# Patient Record
Sex: MALE | Race: WHITE | NOT HISPANIC OR LATINO | Employment: OTHER | URBAN - NONMETROPOLITAN AREA
[De-identification: names, ages, dates, MRNs, and addresses within clinical notes are randomized per-mention and may not be internally consistent; named-entity substitution may affect disease eponyms.]

---

## 2017-02-07 ENCOUNTER — OFFICE VISIT (OUTPATIENT)
Dept: CARDIOLOGY | Facility: CLINIC | Age: 67
End: 2017-02-07

## 2017-02-07 VITALS
DIASTOLIC BLOOD PRESSURE: 60 MMHG | BODY MASS INDEX: 26.37 KG/M2 | WEIGHT: 174 LBS | HEART RATE: 80 BPM | HEIGHT: 68 IN | SYSTOLIC BLOOD PRESSURE: 110 MMHG

## 2017-02-07 DIAGNOSIS — I73.9 PVD (PERIPHERAL VASCULAR DISEASE) (HCC): ICD-10-CM

## 2017-02-07 DIAGNOSIS — I25.118 CORONARY ARTERY DISEASE INVOLVING NATIVE CORONARY ARTERY OF NATIVE HEART WITH OTHER FORM OF ANGINA PECTORIS (HCC): Primary | ICD-10-CM

## 2017-02-07 DIAGNOSIS — J44.9 COPD MIXED TYPE (HCC): ICD-10-CM

## 2017-02-07 DIAGNOSIS — R06.02 SHORTNESS OF BREATH: ICD-10-CM

## 2017-02-07 DIAGNOSIS — I25.10 CORONARY ARTERY DISEASE INVOLVING NATIVE CORONARY ARTERY OF NATIVE HEART WITHOUT ANGINA PECTORIS: ICD-10-CM

## 2017-02-07 DIAGNOSIS — R60.0 LOCALIZED EDEMA: ICD-10-CM

## 2017-02-07 DIAGNOSIS — R07.89 OTHER CHEST PAIN: ICD-10-CM

## 2017-02-07 PROBLEM — R07.9 CHEST PAIN: Status: ACTIVE | Noted: 2017-02-07

## 2017-02-07 PROBLEM — E78.5 HYPERLIPEMIA: Status: ACTIVE | Noted: 2017-02-07

## 2017-02-07 PROCEDURE — 99214 OFFICE O/P EST MOD 30 MIN: CPT | Performed by: NURSE PRACTITIONER

## 2017-02-07 PROCEDURE — 93000 ELECTROCARDIOGRAM COMPLETE: CPT | Performed by: NURSE PRACTITIONER

## 2017-02-07 RX ORDER — ATORVASTATIN CALCIUM 40 MG/1
40 TABLET, FILM COATED ORAL DAILY
COMMUNITY

## 2017-02-07 RX ORDER — ONDANSETRON 4 MG/1
4 TABLET, ORALLY DISINTEGRATING ORAL EVERY 8 HOURS PRN
COMMUNITY

## 2017-02-07 RX ORDER — FAMOTIDINE 20 MG/1
20 TABLET, FILM COATED ORAL 2 TIMES DAILY PRN
COMMUNITY

## 2017-02-07 RX ORDER — SENNOSIDES 8.6 MG
650 CAPSULE ORAL EVERY 8 HOURS PRN
COMMUNITY

## 2017-02-07 RX ORDER — FUROSEMIDE 40 MG/1
40 TABLET ORAL DAILY
COMMUNITY

## 2017-02-07 RX ORDER — ISOSORBIDE MONONITRATE 30 MG/1
30 TABLET, EXTENDED RELEASE ORAL DAILY
COMMUNITY
End: 2017-02-14 | Stop reason: DRUGHIGH

## 2017-02-07 RX ORDER — CILOSTAZOL 100 MG/1
100 TABLET ORAL 2 TIMES DAILY
COMMUNITY

## 2017-02-07 RX ORDER — METOPROLOL TARTRATE 75 MG/1
75 TABLET, FILM COATED ORAL EVERY 8 HOURS
COMMUNITY
End: 2018-01-01 | Stop reason: DRUGHIGH

## 2017-02-07 RX ORDER — CLOPIDOGREL BISULFATE 75 MG/1
75 TABLET ORAL DAILY
COMMUNITY

## 2017-02-07 RX ORDER — POTASSIUM CHLORIDE 20 MEQ/1
20 TABLET, EXTENDED RELEASE ORAL DAILY
COMMUNITY

## 2017-02-07 RX ORDER — MULTIPLE VITAMINS W/ MINERALS TAB 9MG-400MCG
1 TAB ORAL DAILY
COMMUNITY

## 2017-02-07 RX ORDER — ASPIRIN 81 MG/1
81 TABLET, CHEWABLE ORAL DAILY
COMMUNITY

## 2017-02-07 NOTE — PROGRESS NOTES
Chief Complaint   Patient presents with   • Follow-up     was just recently discharged from  hospital due to appendics ruptured and aspiration pneumonia, patient reports soa and chest pain, patient has been residing at [facility] since discharge from  hospital. patient wears oxygen at 2 liters at night., also has bipap.       Cardiac Complaints  chest pressure/discomfort and dyspnea edema      Subjective   Kevin Castro is a 67 y.o. male with a history of severe peripheral vascular disease for which he had bilateral iliac stenting in 2014.  At his cardiac cath at the same time, it showed chronically occluded RCA and multiple lesions in the circumflex which were not suitable for stenting.  The LAD was reported with moderate stenosis and slightly diminished LV function, medication management recommended.  He returns today for follow up and states recent discharge from  for ruptured appendix and aspiration pneumonia, no records are available from admission. Since discharge from the hospital, he has been wearing oxygen to sleep and resides at [facility]. He had some some issues with aspiration pneumonia and has just finished a swallow study and daughter reports he was in the hospital at  for 3 months. He returns today for follow up and states he has issues with shortness of air with exertion and chest discomfort that seems to be around constantly and remains about the same with any activity. Daughter states physicians at  wanted patient to have more cardiac workup since he had been having concerns while in hospital, cardiac workup was not done during stay. He has been taking NTG for it and it seems to help, he has not had any since he has been out of  the hospital.  He states that blood work will be monitored with PCP/nursing home.  He does report a little bit more swelling recently, but states this started with being on his feet more, he is taking lasix for this.  He denies any issues with  claudication.        Cardiac History  Past Surgical History   Procedure Laterality Date   • Duplex scans - converted  07/31/2014     ([facility]) Increased Velocities    • Echo - converted  07/31/2014     ([facility]. Dr. Giang) EF 60%    • Other surgical history  07/31/2014     CTA Abd- 80% Both Iliac    • Cardiovascular stress test  09/25/2014      L. Myoview- Inferiolateral Ischemia.    • Cardiac catheterization  10/08/2014     (Dr. Hastings) 100% RCA. Mul long 80% LCX. EF 50%.    • Other surgical history  10/08/2014     P. Angio- (Dr. Hastings) Bubba Iliac Stenting        Current Outpatient Prescriptions   Medication Sig Dispense Refill   • acetaminophen (TYLENOL) 650 MG 8 hr tablet Take 650 mg by mouth Every 8 (Eight) Hours As Needed for mild pain (1-3).     • aspirin 81 MG chewable tablet Chew 81 mg Daily.     • atorvastatin (LIPITOR) 40 MG tablet Take 40 mg by mouth Daily.     • cilostazol (PLETAL) 100 MG tablet Take 100 mg by mouth 2 (Two) Times a Day.     • clopidogrel (PLAVIX) 75 MG tablet Take 75 mg by mouth Daily.     • famotidine (PEPCID) 20 MG tablet Take 20 mg by mouth 2 (Two) Times a Day.     • furosemide (LASIX) 40 MG tablet Take 40 mg by mouth Daily.     • isosorbide mononitrate (IMDUR) 30 MG 24 hr tablet Take 30 mg by mouth Daily.     • magnesium oxide (MAGOX) 400 (241.3 MG) MG tablet tablet Take 400 mg by mouth 2 (Two) Times a Day.     • Metoprolol Tartrate 75 MG tablet Take 75 mg by mouth Every 8 (Eight) Hours.     • Multiple Vitamins-Minerals (MULTIVITAMIN WITH MINERALS) tablet tablet Take 1 tablet by mouth Daily.     • ondansetron ODT (ZOFRAN-ODT) 4 MG disintegrating tablet Take 4 mg by mouth Every 8 (Eight) Hours As Needed for nausea or vomiting.     • potassium chloride (K-DUR,KLOR-CON) 20 MEQ CR tablet Take 20 mEq by mouth Daily.       No current facility-administered medications for this visit.        Review of patient's allergies indicates no known allergies.    Past Medical History   Diagnosis Date   • ASVD  "(arteriosclerotic vascular disease)    • CAD (coronary artery disease)    • COPD (chronic obstructive pulmonary disease)    • GERD (gastroesophageal reflux disease)    • H/O ventral hernia repair 2004   • History of appendectomy         • Hyperlipidemia    • Hypertension    • PAD (peripheral artery disease)    • Peripheral vascular disease      Claudication    • Tobacco abuse        Social History     Social History   • Marital status: Single     Spouse name: N/A   • Number of children: N/A   • Years of education: N/A     Occupational History   • Not on file.     Social History Main Topics   • Smoking status: Former Smoker     Quit date: 1/7/2010   • Smokeless tobacco: Not on file   • Alcohol use No   • Drug use: No   • Sexual activity: Not on file     Other Topics Concern   • Not on file     Social History Narrative       Family History   Problem Relation Age of Onset   • Hypertension Mother    • Alzheimer's disease Mother    • Hypertension Father    • Heart disease Father    • Heart disease Daughter    • Other Daughter      SVT    • Melanoma Son      stage 3       Review of Systems   Constitutional: Negative.    Respiratory: Positive for shortness of breath.    Cardiovascular: Positive for chest pain and leg swelling.   Gastrointestinal: Negative.    Genitourinary: Negative.    Musculoskeletal: Negative.    Skin: Negative.    Neurological: Negative.    Psychiatric/Behavioral: Negative.        DiabetesNo  Thyroidnormal    Objective     Visit Vitals   • /60 (BP Location: Left arm)   • Pulse 80   • Ht 68\" (172.7 cm)   • Wt 174 lb (78.9 kg)   • BMI 26.46 kg/m2       Physical Exam   Constitutional: He is oriented to person, place, and time. He appears well-developed and well-nourished.   HENT:   Head: Normocephalic and atraumatic.   Eyes: EOM are normal. Pupils are equal, round, and reactive to light.   Neck: Normal range of motion.   Cardiovascular: Normal rate and regular rhythm.    Murmur " heard.  Pulmonary/Chest: Effort normal and breath sounds normal.   Abdominal: Soft.   Musculoskeletal: Normal range of motion. He exhibits edema.   Neurological: He is alert and oriented to person, place, and time.   Skin: Skin is warm and dry.   Psychiatric: He has a normal mood and affect.         ECG 12 Lead  Date/Time: 2/7/2017 3:33 PM  Performed by: KWASI GUY  Authorized by: KWASI GUY   Rhythm: sinus rhythm  BPM: 88  Conduction: incomplete RBBB  Clinical impression: abnormal ECG  Comments: RSR pattern V1           Assessment/Plan     HR and BP are stable.  EKG done today for chest discomfort shows a sinus rhythm with RSR in V1.  We will advise on repeat workup with stress and echo since patient continues to have shortness of breath and chest discomfort, more recommendations to follow.  Patient encouraged to continue daily use of imdur for chest discomfort.  Labs he reports with you, could you please add a BNP and CMP to next draw?  Edema seems to be about 1+ in RLE but patient reports foot drop and just beginning to walk again, patient encouraged to limit sodium intake. Refills he states with you as well.  Lung status and dysphagia protocol he reports as being monitored closely at the nursing home.  6 month follow up advised or sooner if needed.        Problems Addressed this Visit        Cardiovascular and Mediastinum    PVD (peripheral vascular disease)    Relevant Orders    ECG 12 Lead    Adult Transthoracic Echo Complete    Stress Test With Myocardial Perfusion One Day    CAD (coronary artery disease) - Primary    Relevant Medications    isosorbide mononitrate (IMDUR) 30 MG 24 hr tablet    Metoprolol Tartrate 75 MG tablet    clopidogrel (PLAVIX) 75 MG tablet    cilostazol (PLETAL) 100 MG tablet    Other Relevant Orders    ECG 12 Lead    Adult Transthoracic Echo Complete    Stress Test With Myocardial Perfusion One Day       Nervous and Auditory    Chest pain    Relevant Orders    ECG 12 Lead     Adult Transthoracic Echo Complete    Stress Test With Myocardial Perfusion One Day      Other Visit Diagnoses     COPD mixed type        Shortness of breath        Relevant Orders    ECG 12 Lead    Adult Transthoracic Echo Complete    Stress Test With Myocardial Perfusion One Day    Localized edema                  Electronically signed by ARACELY Rowe February 7, 2017 4:21 PM

## 2017-02-13 ENCOUNTER — HOSPITAL ENCOUNTER (OUTPATIENT)
Dept: CARDIOLOGY | Facility: HOSPITAL | Age: 67
Discharge: HOME | End: 2017-02-13

## 2017-02-13 ENCOUNTER — OUTSIDE FACILITY SERVICE (OUTPATIENT)
Dept: CARDIOLOGY | Facility: CLINIC | Age: 67
End: 2017-02-13

## 2017-02-13 LAB
MAXIMAL PREDICTED HEART RATE: 153 BPM
STRESS TARGET HR: 130 BPM

## 2017-02-13 PROCEDURE — 93017 CV STRESS TEST TRACING ONLY: CPT

## 2017-02-13 PROCEDURE — 0 TECHNETIUM SESTAMIBI: Performed by: INTERNAL MEDICINE

## 2017-02-13 PROCEDURE — A9500 TC99M SESTAMIBI: HCPCS | Performed by: INTERNAL MEDICINE

## 2017-02-13 PROCEDURE — 78452 HT MUSCLE IMAGE SPECT MULT: CPT

## 2017-02-13 PROCEDURE — 93018 CV STRESS TEST I&R ONLY: CPT | Performed by: INTERNAL MEDICINE

## 2017-02-13 PROCEDURE — 93306 TTE W/DOPPLER COMPLETE: CPT | Performed by: INTERNAL MEDICINE

## 2017-02-13 PROCEDURE — 25010000002 REGADENOSON 0.4 MG/5ML SOLUTION: Performed by: INTERNAL MEDICINE

## 2017-02-13 PROCEDURE — 78452 HT MUSCLE IMAGE SPECT MULT: CPT | Performed by: INTERNAL MEDICINE

## 2017-02-13 PROCEDURE — 93306 TTE W/DOPPLER COMPLETE: CPT

## 2017-02-13 RX ADMIN — Medication 1 DOSE: at 13:00

## 2017-02-13 RX ADMIN — REGADENOSON 0.4 MG: 0.08 INJECTION, SOLUTION INTRAVENOUS at 13:00

## 2017-02-14 ENCOUNTER — TELEPHONE (OUTPATIENT)
Dept: CARDIOLOGY | Facility: CLINIC | Age: 67
End: 2017-02-14

## 2017-02-14 RX ORDER — ISOSORBIDE MONONITRATE 60 MG/1
60 TABLET, EXTENDED RELEASE ORAL EVERY MORNING
Qty: 30 TABLET | Refills: 8 | Status: SHIPPED | OUTPATIENT
Start: 2017-02-14 | End: 2017-05-08 | Stop reason: SDUPTHER

## 2017-02-14 NOTE — TELEPHONE ENCOUNTER
Patient aware of stress test results and recommendations.  I spoke with his daughter, Lorrie.  Increase Imdur to 60 mg daily and aware if symptoms persist to call the office, may need cardiac catheterization.

## 2017-02-17 ENCOUNTER — TELEPHONE (OUTPATIENT)
Dept: CARDIOLOGY | Facility: CLINIC | Age: 67
End: 2017-02-17

## 2017-02-17 DIAGNOSIS — R60.0 LOCALIZED EDEMA: ICD-10-CM

## 2017-02-17 DIAGNOSIS — I25.10 CORONARY ARTERY DISEASE INVOLVING NATIVE CORONARY ARTERY OF NATIVE HEART WITHOUT ANGINA PECTORIS: ICD-10-CM

## 2017-02-17 DIAGNOSIS — I73.9 PVD (PERIPHERAL VASCULAR DISEASE) (HCC): Primary | ICD-10-CM

## 2017-02-17 NOTE — TELEPHONE ENCOUNTER
----- Message from ARACELY Coyle sent at 2/14/2017  4:58 PM EST -----  Not sure his blood pressure will tolerate 60 of imdur.  His blood pressure was 110/60 at visit.  May need a cath.  His daughter was pretty insistent about it.  ----- Message -----     From: Carina Kim     Sent: 2/14/2017   1:46 PM       To: ARACELY Coyle

## 2017-02-20 NOTE — TELEPHONE ENCOUNTER
I spoke with daughter, Lorrie.  Patient is going to do labs at [facility] then once CMP reviewed then will proceed with CTA.  Lorrie wants CTA scheduled at [facility].

## 2017-03-03 ENCOUNTER — HOSPITAL ENCOUNTER (OUTPATIENT)
Dept: CT IMAGING | Facility: HOSPITAL | Age: 67
Discharge: HOME | End: 2017-03-03
Admitting: NURSE PRACTITIONER

## 2017-03-03 PROCEDURE — 75635 CT ANGIO ABDOMINAL ARTERIES: CPT

## 2017-03-03 PROCEDURE — 0 IOPAMIDOL PER 1 ML: Performed by: NURSE PRACTITIONER

## 2017-03-03 PROCEDURE — 82565 ASSAY OF CREATININE: CPT

## 2017-03-03 RX ADMIN — IOPAMIDOL 150 ML: 755 INJECTION, SOLUTION INTRAVENOUS at 16:20

## 2017-03-05 LAB — CREAT BLDA-MCNC: 1.4 MG/DL (ref 0.6–1.3)

## 2017-05-08 RX ORDER — ISOSORBIDE MONONITRATE 60 MG/1
60 TABLET, EXTENDED RELEASE ORAL EVERY MORNING
Qty: 30 TABLET | Refills: 8 | Status: SHIPPED | OUTPATIENT
Start: 2017-05-08 | End: 2018-01-01 | Stop reason: SDUPTHER

## 2018-01-01 ENCOUNTER — OUTSIDE FACILITY SERVICE (OUTPATIENT)
Dept: CARDIOLOGY | Facility: CLINIC | Age: 68
End: 2018-01-01

## 2018-01-01 ENCOUNTER — TELEPHONE (OUTPATIENT)
Dept: CARDIOLOGY | Facility: CLINIC | Age: 68
End: 2018-01-01

## 2018-01-01 ENCOUNTER — OFFICE VISIT (OUTPATIENT)
Dept: CARDIOLOGY | Facility: CLINIC | Age: 68
End: 2018-01-01

## 2018-01-01 VITALS
HEART RATE: 64 BPM | SYSTOLIC BLOOD PRESSURE: 120 MMHG | DIASTOLIC BLOOD PRESSURE: 76 MMHG | BODY MASS INDEX: 32.89 KG/M2 | HEIGHT: 68 IN | WEIGHT: 217 LBS

## 2018-01-01 DIAGNOSIS — I25.118 CORONARY ARTERY DISEASE OF NATIVE ARTERY OF NATIVE HEART WITH STABLE ANGINA PECTORIS (HCC): ICD-10-CM

## 2018-01-01 DIAGNOSIS — R06.02 SHORTNESS OF BREATH: ICD-10-CM

## 2018-01-01 DIAGNOSIS — E78.2 MIXED HYPERLIPIDEMIA: ICD-10-CM

## 2018-01-01 DIAGNOSIS — R07.89 OTHER CHEST PAIN: ICD-10-CM

## 2018-01-01 DIAGNOSIS — I73.9 PVD (PERIPHERAL VASCULAR DISEASE) (HCC): Primary | ICD-10-CM

## 2018-01-01 DIAGNOSIS — R53.83 OTHER FATIGUE: ICD-10-CM

## 2018-01-01 PROCEDURE — 93306 TTE W/DOPPLER COMPLETE: CPT | Performed by: INTERNAL MEDICINE

## 2018-01-01 PROCEDURE — 99232 SBSQ HOSP IP/OBS MODERATE 35: CPT | Performed by: INTERNAL MEDICINE

## 2018-01-01 PROCEDURE — 99223 1ST HOSP IP/OBS HIGH 75: CPT | Performed by: INTERNAL MEDICINE

## 2018-01-01 PROCEDURE — 93308 TTE F-UP OR LMTD: CPT | Performed by: INTERNAL MEDICINE

## 2018-01-01 PROCEDURE — 99214 OFFICE O/P EST MOD 30 MIN: CPT | Performed by: NURSE PRACTITIONER

## 2018-01-01 RX ORDER — ISOSORBIDE MONONITRATE 60 MG/1
60 TABLET, EXTENDED RELEASE ORAL EVERY MORNING
Qty: 30 TABLET | Refills: 0 | Status: SHIPPED | OUTPATIENT
Start: 2018-01-01 | End: 2018-01-01 | Stop reason: SDUPTHER

## 2018-01-01 RX ORDER — ISOSORBIDE MONONITRATE 60 MG/1
TABLET, EXTENDED RELEASE ORAL
Qty: 30 TABLET | Refills: 5 | Status: SHIPPED | OUTPATIENT
Start: 2018-01-01 | End: 2018-01-01 | Stop reason: SDUPTHER

## 2018-01-01 RX ORDER — ISOSORBIDE MONONITRATE 60 MG/1
60 TABLET, EXTENDED RELEASE ORAL EVERY MORNING
Qty: 90 TABLET | Refills: 3 | Status: SHIPPED | OUTPATIENT
Start: 2018-01-01

## 2018-01-01 RX ORDER — METOPROLOL TARTRATE 100 MG/1
100 TABLET ORAL 2 TIMES DAILY
COMMUNITY

## 2018-03-22 NOTE — TELEPHONE ENCOUNTER
Patients daughter called requesting refill on Imdur 60mg daily, 30 day refill sent to pharmacy on Imdur 60mg daily.

## 2018-04-09 NOTE — PROGRESS NOTES
Chief Complaint   Patient presents with   • Follow-up     Cardiac management. No labs in a year. Daughter verbalized current medication list.   • Chest Pain     He reports having dull aching pain to left chest occasionally when out walking around.   • Shortness of Breath     Having some shortness of breath with exertion, he states still coughs daily, continues to have some swallowing problems, he reports just received referral to Dr Kulkarni.   • Med Refill     Needs refills on Imdur-90 day.       Subjective       Kevin Castro is a 68 y.o. male with a history of IHD, hypertension, hyperlipidemia, COPD, severe peripheral vascular disease for which he had bilateral iliac stenting in 2014.  Cardiac cath was done at that time which showed chronically occluded RCA and multiple lesions in the circumflex which were not suitable for stenting.  The LAD was reported with moderate stenosis and slightly diminished LV function, medication management recommended.  November 2016, he suffered a ruptured appendix complicated by aspiration pneumonia and was admitted to  for an extended period of time (3 months).  Since then, he has had shortness of breath, cough, and wheezing.  After discharged he was placed in a rehab/nursing facility then later went to live with daughter who cares for him.  He regained strength and remains independent with ADLs but admits to being fairly sedentary.  Cardiac work up was repeated after last fu 2/2017 for reports of shortness of breath and occasional chest tightness relieved by sl nitro.  Lexiscan showed moderate infarct of the inferolateral wall with no significant reversibility and LVEF 46% during stress and 49% during rest.  Imdur was increased to 60 mg daily with plan for cath if symptoms persisted.  Echo showed EF 45-50%, mild MR, RVSP 29 mmHg.  CTA of LE was done at  in March 2017 and was told vessels were patent.  No report available.  He came in today for his follow up visit accompanied by  his daughter.  His chief complaint today is shortness of breath.  When he gets up to do any activity, he feels short of breath, tight and wheezy.  He continues to have occasional chest discomfort but not significant.  He is using sl nitro less often.  He is planning to see Dr. Kulkarni soon for his initial pulmonary consult.  Denies orthopnea, edema has improved.  He has gained more than 40 pounds since last fu one year ago, but weight was down after lengthy admission.   No recent labs.  He has maintained smoking cessation since 2010.    HPI         Cardiac History:    Past Surgical History:   Procedure Laterality Date   • CARDIAC CATHETERIZATION  10/08/2014    (Dr. Hastings) 100% RCA. Mul long 80% LCX. EF 50%.    • CARDIOVASCULAR STRESS TEST  09/25/2014     L. Myoview- Inferiolateral Ischemia.    • CARDIOVASCULAR STRESS TEST  02/13/2017    Lexiscan- inferolateral infarct without reversibilty, EF 46% stress, 49% rest   • CONVERTED (HISTORICAL) DUPLEX SCANS  07/31/2014    ([facility]) Increased Velocities    • ECHO - CONVERTED  07/31/2014    ([facility]. Dr. Giang) EF 60%    • ILIAC ARTERY STENT Bilateral    • OTHER SURGICAL HISTORY  07/31/2014    CTA Abd- 80% Both Iliac    • OTHER SURGICAL HISTORY  10/08/2014    P. Angio- (Dr. Hastings) Bubba Iliac Stenting        Current Outpatient Prescriptions   Medication Sig Dispense Refill   • acetaminophen (TYLENOL) 650 MG 8 hr tablet Take 650 mg by mouth Every 8 (Eight) Hours As Needed for mild pain (1-3).     • aspirin 81 MG chewable tablet Chew 81 mg Daily.     • atorvastatin (LIPITOR) 40 MG tablet Take 40 mg by mouth Daily.     • cilostazol (PLETAL) 100 MG tablet Take 100 mg by mouth 2 (Two) Times a Day.     • clopidogrel (PLAVIX) 75 MG tablet Take 75 mg by mouth Daily.     • famotidine (PEPCID) 20 MG tablet Take 20 mg by mouth 2 (Two) Times a Day As Needed.     • furosemide (LASIX) 40 MG tablet Take 40 mg by mouth Daily.     • isosorbide mononitrate (IMDUR) 60 MG 24 hr tablet Take 1 tablet by  mouth Every Morning. 90 tablet 3   • magnesium oxide (MAGOX) 400 (241.3 MG) MG tablet tablet Take 400 mg by mouth 2 (Two) Times a Day.     • metoprolol tartrate (LOPRESSOR) 100 MG tablet Take 100 mg by mouth 2 (Two) Times a Day.     • Multiple Vitamins-Minerals (MULTIVITAMIN WITH MINERALS) tablet tablet Take 1 tablet by mouth Daily.     • ondansetron ODT (ZOFRAN-ODT) 4 MG disintegrating tablet Take 4 mg by mouth Every 8 (Eight) Hours As Needed for nausea or vomiting.     • potassium chloride (K-DUR,KLOR-CON) 20 MEQ CR tablet Take 20 mEq by mouth Daily.       No current facility-administered medications for this visit.        Review of patient's allergies indicates no known allergies.    Past Medical History:   Diagnosis Date   • ASVD (arteriosclerotic vascular disease)    • CAD (coronary artery disease)    • COPD (chronic obstructive pulmonary disease)    • GERD (gastroesophageal reflux disease)    • H/O ventral hernia repair 2004   • History of appendectomy        • Hyperlipidemia    • Hypertension    • PAD (peripheral artery disease)    • Peripheral vascular disease     Claudication    • Tobacco abuse        Social History     Social History   • Marital status: Single     Spouse name: N/A   • Number of children: N/A   • Years of education: N/A     Occupational History   • Not on file.     Social History Main Topics   • Smoking status: Former Smoker     Quit date: 1/7/2010   • Smokeless tobacco: Never Used   • Alcohol use No   • Drug use: No   • Sexual activity: Not on file     Other Topics Concern   • Not on file     Social History Narrative   • No narrative on file       Family History   Problem Relation Age of Onset   • Hypertension Mother    • Alzheimer's disease Mother    • Hypertension Father    • Heart disease Father    • Heart disease Daughter    • Other Daughter      SVT    • Melanoma Son      stage 3       Review of Systems   Constitution: Positive for weakness, malaise/fatigue and weight gain.  "Negative for decreased appetite.   Eyes: Negative.    Cardiovascular: Positive for chest pain and dyspnea on exertion. Negative for leg swelling (improved with Lasix), palpitations and syncope.   Respiratory: Positive for cough, shortness of breath, sleep disturbances due to breathing and wheezing. Negative for hemoptysis.    Endocrine: Negative.    Hematologic/Lymphatic: Negative for bleeding problem. Does not bruise/bleed easily.   Skin: Negative.    Musculoskeletal: Positive for arthritis and joint pain.   Gastrointestinal: Positive for dysphagia (ongoing). Negative for abdominal pain and melena.   Genitourinary: Negative for dysuria and hematuria.   Neurological: Positive for focal weakness (right foot drop, improving ). Negative for dizziness.   Psychiatric/Behavioral: Negative for altered mental status and depression.      Diabetes- No  Thyroid-normal, no labs for review     Objective     /76 (BP Location: Left arm)   Pulse 64   Ht 172.7 cm (67.99\")   Wt 98.4 kg (217 lb)   BMI 33.00 kg/m²     Physical Exam   Constitutional: He is oriented to person, place, and time. He appears well-developed and well-nourished.   HENT:   Head: Normocephalic.   Eyes: Pupils are equal, round, and reactive to light.   Neck: Normal range of motion.   Cardiovascular: Normal rate, regular rhythm and intact distal pulses.    Murmur heard.  Pulmonary/Chest: Effort normal and breath sounds normal. No respiratory distress. He has no wheezes.   Abdominal: Soft. Bowel sounds are normal. He exhibits no distension.   Musculoskeletal: Normal range of motion. He exhibits no edema.   Neurological: He is alert and oriented to person, place, and time.   Skin: Skin is warm and dry.   Psychiatric: He has a normal mood and affect.   Nursing note and vitals reviewed.     Procedures          Assessment/Plan    Heart rate and blood pressure stable.  Continue current medications.  Stress and echo findings reviewed with him and his daughter.  " With his continued shortness of breath, we discussed proceeding with cardiac cath to rule out worsening CAD.  He will be seeing Dr. Kulkarni soon for pulmonary evaluation.  If his shortness of breath persists despite pulmonary management, his daughter is advised to contact our office and he will be scheduled for cardiac cath.  Continue Imdur 60 mg.  He is advised to have labs to check lipids, CBC, CMP, and TSH.  Continue Lipitor.  He takes Plavix, Pletal, and low dose aspirin.  Will continue for now as long as there are no signs of bleeding.  Refill sent for Imdur 60 mg daily.  Heart healthy diet encouraged.  He was also encouraged to move about within his home as much as possible.  We will see him back in six months or sooner as needed or if he elects to proceed with cardiac catheterization.      Kevin was seen today for follow-up, chest pain, shortness of breath and med refill.    Diagnoses and all orders for this visit:    PVD (peripheral vascular disease)  -     Comprehensive Metabolic Panel; Future  -     Lipid Panel; Future    Coronary artery disease of native artery of native heart with stable angina pectoris  -     Comprehensive Metabolic Panel; Future  -     Lipid Panel; Future    Mixed hyperlipidemia  -     Comprehensive Metabolic Panel; Future  -     Lipid Panel; Future    Other chest pain  -     CBC (No Diff); Future  -     Comprehensive Metabolic Panel; Future  -     TSH; Future    Shortness of breath  -     CBC (No Diff); Future  -     Comprehensive Metabolic Panel; Future  -     TSH; Future    Other fatigue  -     TSH; Future    Other orders  -     isosorbide mononitrate (IMDUR) 60 MG 24 hr tablet; Take 1 tablet by mouth Every Morning.                    Electronically signed by ARACELY Ralph,  April 9, 2018 8:08 PM

## 2023-09-19 NOTE — TELEPHONE ENCOUNTER
I spoke with daughter.  She was okay with increasing Imdur.  He main concern is his history of PVD and asking about being referred back to Dr. Hastings or testing if you agree.   Post-Care Instructions: I reviewed with the patient in detail post-care instructions. Patient is not to engage in any heavy lifting, exercise, or swimming for the next 14 days. Should the patient develop any fevers, chills, bleeding, severe pain patient will contact the office immediately.